# Patient Record
Sex: FEMALE | URBAN - METROPOLITAN AREA
[De-identification: names, ages, dates, MRNs, and addresses within clinical notes are randomized per-mention and may not be internally consistent; named-entity substitution may affect disease eponyms.]

---

## 2020-07-04 ENCOUNTER — COMMUNICATION - HEALTHEAST (OUTPATIENT)
Dept: SCHEDULING | Facility: CLINIC | Age: 30
End: 2020-07-04

## 2021-06-09 NOTE — TELEPHONE ENCOUNTER
Pt is calling in about a cat bite on her hand from a few minutes ago. Pt reports it is a stray cat, and she is new to the area, and does not have a PCP from here. Pt reports bite is open and was bleeding, but is not bleeding now. Pt does not know who's cat it is, or if it has been vaccinated. Pt was advised also to call animal control if she can find the cat nearby.    Care advice given, wash the wound and cover with guaze, and per protocol pt should be evaluated in the ER. Pt agrees with plan, and will go to the ER. Pt was advised to call back if she has further questions.     Darell Lares RN [facility] Triage/Medication Refill    Reason for Disposition    [1] Any break in skin from BITE (e.g., cut, puncture or scratch) AND[2] PET animial (e.g., dog, cat, or ferret) at risk for RABIES (e.g., sick, stray, unprovoked bite, developing country)    Protocols used: ANIMAL BITE-A-